# Patient Record
(demographics unavailable — no encounter records)

---

## 2025-03-10 NOTE — ASSESSMENT
[FreeTextEntry1] :  ECG: Normal sinus rhythm at 65 right bundle branch block pattern.  Minor prolongation of QTc 457.  ECG obtained to assist in diagnosis and management of assessed problem(s).  Laboratory data: -------3/23/22--8/24/23--2/15/24 Chol---133-------90-------103 HDL-----25-------28--------60 LDL-----79-------37--------29 Trigs---191------145 S9P---------------8.4  Echocardiogram 2/24/2025 Normal LV size and function LVEF 60 to 65% Mild pulmonary hypertension Moderate tricuspid sufficiency Mild pulmonary hypertension PA systolic 38 mmHg.  Echocardiogram: 7/30/2024 LVEF 65 to 70% LVH Left atrial enlargement PA pressure 51 consistent with moderate pulm hypertension  Echocardiogram 7/20/2022: LVH with normal systolic function EF 60 to 65% Trace mitral and aortic insufficiency. Technically difficult study.  Exercise stress test 7/25/2022: Time: 745 seconds (9 METS) Heart rate: 153 bpm (96%) Blood pressure 200/74 hypertensive ECG: Negative for ischemia with upsloping 1 mm ST segment depression Group Duke score 8-low risk  Sleep study 9/30/2024 AHI 3.1 consistent with absence of any significant LAWRENCE.  Impression: 1. ECG: Completed right bundle branch block pattern.  3. Hyperlipidemia: Historically has had reasonable control of Chol and improvement of triglycerides.  Nothing current  4. Obesity with significant recent fluctuations.  BMI currently 39  5. Ankle edema has resolved.  6. Reasonable exercise tolerance without ischemia but evidence of hypertensive response.  7.  Pulmonary artery pressures are lower comparison to the prior study.  8.  Absence of sleep apnea on the sleep study.  Plan: 1. Continue lisinopril HCT 10/12.5 mg p.o. daily and amlodipine.   2.  Resume aggressive weight loss techniques.  3. Consideration of orthopedically friendly exercise that might be beneficial such as recumbent bicycling and/or swimming.  4.  repeat echocardiogram in 12 months to reassess rising pulmonary pressures.  5.  Monitor blood pressure and contact me if becomes elevated.  6.  Obtain and review copies of most recent blood work.

## 2025-03-10 NOTE — ASSESSMENT
[FreeTextEntry1] :  ECG: Normal sinus rhythm at 65 right bundle branch block pattern.  Minor prolongation of QTc 457.  ECG obtained to assist in diagnosis and management of assessed problem(s).  Laboratory data: -------3/23/22--8/24/23--2/15/24 Chol---133-------90-------103 HDL-----25-------28--------60 LDL-----79-------37--------29 Trigs---191------145 M5X---------------2.4  Echocardiogram 2/24/2025 Normal LV size and function LVEF 60 to 65% Mild pulmonary hypertension Moderate tricuspid sufficiency Mild pulmonary hypertension PA systolic 38 mmHg.  Echocardiogram: 7/30/2024 LVEF 65 to 70% LVH Left atrial enlargement PA pressure 51 consistent with moderate pulm hypertension  Echocardiogram 7/20/2022: LVH with normal systolic function EF 60 to 65% Trace mitral and aortic insufficiency. Technically difficult study.  Exercise stress test 7/25/2022: Time: 745 seconds (9 METS) Heart rate: 153 bpm (96%) Blood pressure 200/74 hypertensive ECG: Negative for ischemia with upsloping 1 mm ST segment depression Group Duke score 8-low risk  Sleep study 9/30/2024 AHI 3.1 consistent with absence of any significant LAWRENCE.  Impression: 1. ECG: Completed right bundle branch block pattern.  3. Hyperlipidemia: Historically has had reasonable control of Chol and improvement of triglycerides.  Nothing current  4. Obesity with significant recent fluctuations.  BMI currently 39  5. Ankle edema has resolved.  6. Reasonable exercise tolerance without ischemia but evidence of hypertensive response.  7.  Pulmonary artery pressures are lower comparison to the prior study.  8.  Absence of sleep apnea on the sleep study.  Plan: 1. Continue lisinopril HCT 10/12.5 mg p.o. daily and amlodipine.   2.  Resume aggressive weight loss techniques.  3. Consideration of orthopedically friendly exercise that might be beneficial such as recumbent bicycling and/or swimming.  4.  repeat echocardiogram in 12 months to reassess rising pulmonary pressures.  5.  Monitor blood pressure and contact me if becomes elevated.  6.  Obtain and review copies of most recent blood work.

## 2025-03-10 NOTE — PHYSICAL EXAM
[No Cyanosis] : no cyanosis [No Clubbing] : no clubbing [Normal] : alert and oriented, normal memory [de-identified] : Moderately obese male alert and oriented in no apparent distress [de-identified] : Postop thyroidectomy with intact incision. [de-identified] : Obese, soft, nontender. [de-identified] : Trace to 1+ pretibial edema.

## 2025-03-10 NOTE — REASON FOR VISIT
[FreeTextEntry1] : 63 - year-old male presenting for reevaluation   His history includes: 1.  An abnormal ECG 2.  History of hypertension 3.  Hyperlipidemia 4. Papillary thyroid Ca status post total thyroidectomy 2023. 5.  Pulmonary hypertension  Blood pressure has been well-controlled with lisinopril HCT 10/12.5  Home blood pressure  typically 1 teens to 120s over 60s to 70s He has no dizziness.  Patient has no pre-existing history of cardiovascular disease. Denies shortness of breath, chest pain, palpitations, PND, orthopnea or edema.  Denies smoking history, diabetes or family history of premature coronary disease Notably, both parents  at relatively young ages.  Activity continues to be limited by variety of orthopedic issues including bilateral knee replacements and a bad back.  Has had 2 sleep studies in the past and reportedly does not have any significant obstructive sleep apnea.  Do not have copies of those studies and patient cannot recall how long ago they were done.  He is a former . He has great difficulty lying flat due to his back herniations.

## 2025-03-10 NOTE — HISTORY OF PRESENT ILLNESS
[FreeTextEntry1] : Patient received ALANA after his thyroidectomy.  This seems to have left him somewhat fatigued.  Nonetheless he has no active cardiac symptoms.  Infrequent exercise.  Underwent sleep study and repeat echocardiogram to reassess pulmonary artery pressures.

## 2025-03-10 NOTE — PHYSICAL EXAM
[No Cyanosis] : no cyanosis [No Clubbing] : no clubbing [Normal] : alert and oriented, normal memory [de-identified] : Moderately obese male alert and oriented in no apparent distress [de-identified] : Postop thyroidectomy with intact incision. [de-identified] : Obese, soft, nontender. [de-identified] : Trace to 1+ pretibial edema.